# Patient Record
Sex: FEMALE | Race: WHITE | Employment: PART TIME | URBAN - METROPOLITAN AREA
[De-identification: names, ages, dates, MRNs, and addresses within clinical notes are randomized per-mention and may not be internally consistent; named-entity substitution may affect disease eponyms.]

---

## 2017-10-06 ENCOUNTER — HOSPITAL ENCOUNTER (EMERGENCY)
Age: 67
Discharge: HOME | End: 2017-10-06
Attending: EMERGENCY MEDICINE
Payer: COMMERCIAL

## 2017-10-06 ENCOUNTER — APPOINTMENT (OUTPATIENT)
Dept: GENERAL RADIOLOGY | Age: 67
End: 2017-10-06
Attending: NURSE PRACTITIONER
Payer: COMMERCIAL

## 2017-10-06 VITALS
HEIGHT: 68 IN | HEART RATE: 95 BPM | BODY MASS INDEX: 27.13 KG/M2 | TEMPERATURE: 98 F | RESPIRATION RATE: 16 BRPM | SYSTOLIC BLOOD PRESSURE: 199 MMHG | WEIGHT: 179 LBS | DIASTOLIC BLOOD PRESSURE: 87 MMHG | OXYGEN SATURATION: 97 %
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
## 2017-10-06 PROCEDURE — 72050 X-RAY EXAM NECK SPINE 4/5VWS: CPT

## 2017-10-06 PROCEDURE — 99282 EMERGENCY DEPT VISIT SF MDM: CPT
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
## 2019-11-12 ENCOUNTER — OFFICE VISIT (OUTPATIENT)
Dept: OBGYN CLINIC | Age: 69
End: 2019-11-12

## 2019-11-12 VITALS
HEIGHT: 68 IN | WEIGHT: 179 LBS | BODY MASS INDEX: 27.13 KG/M2 | SYSTOLIC BLOOD PRESSURE: 128 MMHG | DIASTOLIC BLOOD PRESSURE: 82 MMHG

## 2019-11-12 DIAGNOSIS — Z01.419 ENCOUNTER FOR GYNECOLOGICAL EXAMINATION WITHOUT ABNORMAL FINDING: Primary | ICD-10-CM

## 2019-11-12 DIAGNOSIS — E28.39 ESTROGEN DEFICIENCY: ICD-10-CM

## 2019-11-12 NOTE — PROGRESS NOTES
Sharon Chauhan is a ,  81 y.o. female [facility] [place] who [place] for her annual checkup. She is menopausal and amenorrheic. She is having no significant problems. Hormone Status:    She is not having vasomotor symptoms. The patient is not using HRT. She has not had any vaginal bleeding. She reports no gynecologic symptoms. Sexual history:    She  reports that she does not currently engage in sexual activity but has had partner(s) who are Male. Medical conditions:    Since her last annual GYN exam about 2015 ago, she has had the following changes in her health history: none. Surgical history confirmed with patient. has a past surgical history that includes hx gyn () and hx heent. Pap and Mammogram History:    Her most recent Pap smear was normal/-HPV obtained 2015 year(s) ago. The patient has not had a recent mammogram. Per patient she is scheduled to get mammogram in December. Breast Cancer History/Substance Abuse:     She does not have a family history of breast cancer. Osteoporosis History:    Family history does not include a first or second degree relative with osteopenia or osteoporosis. A bone density scan was obtained 2016  and revealed Osteopenic. Per report, patient was advised to repeat scan in 2-3 years. She is not currently taking calcium and vit D. Past Medical History:   Diagnosis Date    Alopecia     Osteopenia      Past Surgical History:   Procedure Laterality Date    HX GYN      endometrial ablation    HX HEENT      cataract removal       Allergies: Patient has no known allergies.    Social History     Socioeconomic History    Marital status:      Spouse name: Not on file    Number of children: Not on file    Years of education: Not on file    Highest education level: Not on file   Occupational History    Not on file   Social Needs    Financial resource strain: Not on file    Food insecurity:     Worry: Not on file     Inability: Not on file    Transportation needs:     Medical: Not on file     Non-medical: Not on file   Tobacco Use    Smoking status: Current Every Day Smoker     Packs/day: 0.50   Substance and Sexual Activity    Alcohol use: No    Drug use: Not on file    Sexual activity: Not Currently     Partners: Male   Lifestyle    Physical activity:     Days per week: Not on file     Minutes per session: Not on file    Stress: Not on file   Relationships    Social connections:     Talks on phone: Not on file     Gets together: Not on file     Attends Alevism service: Not on file     Active member of club or organization: Not on file     Attends meetings of clubs or organizations: Not on file     Relationship status: Not on file    Intimate partner violence:     Fear of current or ex partner: Not on file     Emotionally abused: Not on file     Physically abused: Not on file     Forced sexual activity: Not on file   Other Topics Concern    Not on file   Social History Narrative    Not on file     Tobacco History:  reports that she has been smoking. She has been smoking about 0.50 packs per day. She does not have any smokeless tobacco history on file. Alcohol Abuse:  reports that she does not drink alcohol. Drug Abuse:  has no drug history on file. There is no problem list on file for this patient.       Review of Systems - History obtained from the patient  Constitutional: negative for weight loss, fever, night sweats  HEENT: negative for hearing loss, earache, congestion, snoring, sorethroat  CV: negative for chest pain, palpitations, edema  Resp: negative for cough, shortness of breath, wheezing  GI: negative for change in bowel habits, abdominal pain, black or bloody stools  : negative for frequency, dysuria, hematuria, vaginal discharge  MSK: negative for back pain, joint pain, muscle pain  Breast: negative for breast lumps, nipple discharge, galactorrhea  Skin :negative for itching, rash, hives  Neuro: negative for dizziness, headache, confusion, weakness  Psych: negative for anxiety, depression, change in mood  Heme/lymph: negative for bleeding, bruising, pallor    Physical Exam    Visit Vitals  /82   Ht 5' 8\" (1.727 m)   Wt 179 lb (81.2 kg)   BMI 27.22 kg/m²     Constitutional  · Appearance: well-nourished, well developed, alert, in no acute distress    HENT  · Head and Face: appears normal    Neck  · Inspection/Palpation: normal appearance, no masses or tenderness  · Lymph Nodes: no lymphadenopathy present  · Thyroid: gland size normal, nontender, no nodules or masses present on palpation    Chest  · Respiratory Effort: breathing normal  · Auscultation: normal breath sounds    Cardiovascular  · Heart:  · Auscultation: regular rate and rhythm without murmur    Breasts  · Inspection of Breasts: breasts symmetrical, no skin changes, no discharge present, nipple appearance normal, no skin retraction present  · Palpation of Breasts and Axillae: no masses present on palpation, no breast tenderness  · Axillary Lymph Nodes: no lymphadenopathy present    Gastrointestinal  · Abdominal Examination: abdomen non-tender to palpation, normal bowel sounds, no masses present  · Liver and spleen: no hepatomegaly present, spleen not palpable  · Hernias: no hernias identified    Genitourinary  · External Genitalia: normal appearance for age with atrophy, no discharge present, no tenderness present, no inflammatory lesions present, no masses present  · Vagina:atrophic vaginal vault with pale epithelium and flattening of rugae, without central or paravaginal defects, no discharge present, no inflammatory lesions present, no masses present  · Bladder: non-tender to palpation  · Urethra: appears normal  · Cervix: normal   · Uterus: normal size, shape and consistency  · Adnexa: no adnexal tenderness present, no adnexal masses present  · Perineum: perineum within normal limits, no evidence of trauma, no rashes or skin lesions present  · Anus: anus within normal limits, no hemorrhoids present  · Inguinal Lymph Nodes: no lymphadenopathy present    Skin  · General Inspection: no rash, no lesions identified    Neurologic/Psychiatric  · Mental Status:  · Orientation: grossly oriented to person, place and time  · Mood and Affect: mood normal, affect appropriate    . Assessment:  Routine gynecologic examination  Her current medical status is satisfactory with no evidence of significant gynecologic issues.     Plan:  Counseled re: diet, exercise, healthy lifestyle  Return for yearly wellness visits  Rec annual mammogram  Pap  DEXA

## 2019-11-12 NOTE — PATIENT INSTRUCTIONS
Well Visit, Over 72: Care Instructions  Your Care Instructions    Physical exams can help you stay healthy. Your doctor has checked your overall health and may have suggested ways to take good care of yourself. He or she also may have recommended tests. At home, you can help prevent illness with healthy eating, regular exercise, and other steps. Follow-up care is a key part of your treatment and safety. Be sure to make and go to all appointments, and call your doctor if you are having problems. It's also a good idea to know your test results and keep a list of the medicines you take. How can you care for yourself at home? · Reach and stay at a healthy weight. This will lower your risk for many problems, such as obesity, diabetes, heart disease, and high blood pressure. · Get at least 30 minutes of exercise on most days of the week. Walking is a good choice. You also may want to do other activities, such as running, swimming, cycling, or playing tennis or team sports. · Do not smoke. Smoking can make health problems worse. If you need help quitting, talk to your doctor about stop-smoking programs and medicines. These can increase your chances of quitting for good. · Protect your skin from too much sun. When you're outdoors from 10 a.m. to 4 p.m., stay in the shade or cover up with clothing and a hat with a wide brim. Wear sunglasses that block UV rays. Even when it's cloudy, put broad-spectrum sunscreen (SPF 30 or higher) on any exposed skin. · See a dentist one or two times a year for checkups and to have your teeth cleaned. · Wear a seat belt in the car. Follow your doctor's advice about when to have certain tests. These tests can spot problems early. For men and women  · Cholesterol. Your doctor will tell you how often to have this done based on your overall health and other things that can increase your risk for heart attack and stroke. · Blood pressure.  Have your blood pressure checked during a routine doctor visit. Your doctor will tell you how often to check your blood pressure based on your age, your blood pressure results, and other factors. · Diabetes. Ask your doctor whether you should have tests for diabetes. · Vision. Experts recommend that you have yearly exams for glaucoma and other age-related eye problems. · Hearing. Tell your doctor if you notice any change in your hearing. You can have tests to find out how well you hear. · Colon cancer tests. Keep having colon cancer tests as your doctor recommends. You can have one of several types of tests. · Heart attack and stroke risk. At least every 4 to 6 years, you should have your risk for heart attack and stroke assessed. Your doctor uses factors such as your age, blood pressure, cholesterol, and whether you smoke or have diabetes to show what your risk for a heart attack or stroke is over the next 10 years. · Osteoporosis. Talk to your doctor about whether you should have a bone density test to find out whether you have thinning bones. Also ask your doctor about whether you should take calcium and vitamin D supplements. For women  · Pap test and pelvic exam. You may no longer need a Pap test. Talk with your doctor about whether to stop or continue to have Pap tests. · Breast exam and mammogram. Ask how often you should have a mammogram, which is an X-ray of your breasts. A mammogram can spot breast cancer before it can be felt and when it is easiest to treat. · Thyroid disease. Talk to your doctor about whether to have your thyroid checked as part of a regular physical exam. Women have an increased chance of a thyroid problem. For men  · Prostate exam. Talk to your doctor about whether you should have a blood test (called a PSA test) for prostate cancer.  Experts recommend that you discuss the benefits and risks of the test with your doctor before you decide whether to have this test. Some experts say that men ages 79 and older no longer need testing. · Abdominal aortic aneurysm. Ask your doctor whether you should have a test to check for an aneurysm. You may need a test if you ever smoked or if your parent, brother, sister, or child has had an aneurysm. When should you call for help? Watch closely for changes in your health, and be sure to contact your doctor if you have any problems or symptoms that concern you. Where can you learn more? Go to http://yasir-juan pablo.info/. Enter D643 in the search box to learn more about \"Well Visit, Over 65: Care Instructions. \"  Current as of: December 13, 2018  Content Version: 12.2  © 6135-6401 [employer]. Care instructions adapted under license by [employer] (which disclaims liability or warranty for this information). If you have questions about a medical condition or this instruction, always ask your healthcare professional. [place] [address] any warranty or liability for your use of this information.

## 2019-11-17 LAB
CYTOLOGIST CVX/VAG CYTO: NORMAL
CYTOLOGY CVX/VAG DOC CYTO: NORMAL
CYTOLOGY CVX/VAG DOC THIN PREP: NORMAL
CYTOLOGY HISTORY:: NORMAL
DX ICD CODE: NORMAL
LABCORP, 190119: NORMAL
Lab: NORMAL
OTHER STN SPEC: NORMAL
STAT OF ADQ CVX/VAG CYTO-IMP: NORMAL

## 2019-11-22 ENCOUNTER — HOSPITAL ENCOUNTER (OUTPATIENT)
Dept: MAMMOGRAPHY | Age: 69
Discharge: HOME | End: 2019-11-22
Attending: OBSTETRICS & GYNECOLOGY
Payer: COMMERCIAL

## 2019-11-22 DIAGNOSIS — E28.39 ESTROGEN DEFICIENCY: ICD-10-CM

## 2019-11-22 PROCEDURE — 77080 DXA BONE DENSITY AXIAL: CPT

## 2019-12-18 ENCOUNTER — APPOINTMENT (OUTPATIENT)
Dept: OBGYN CLINIC | Age: 69
End: 2019-12-18

## 2021-12-07 DIAGNOSIS — E28.39 ESTROGEN DEFICIENCY: Primary | ICD-10-CM
